# Patient Record
(demographics unavailable — no encounter records)

---

## 2025-03-18 NOTE — IMAGING
[Right] : right knee [There are no fractures, subluxations or dislocations. No significant abnormalities are seen] : There are no fractures, subluxations or dislocations. No significant abnormalities are seen [Mild patellofemoral OA] : Mild patellofemoral OA [de-identified] :   ----------------------------------------------------------------------------   Right knee exam:   Skin: no significant pertinent finding  Inspection:     (+moderate) Effusion     (neg) Malalignment     (neg) Swelling     (neg) Quad atrophy     (neg) J-sign  ROM:     0 - 125 degrees of flexion.  Tenderness:     (+) MJLT     (+) LJLT     (neg) Medial patellar facet tenderness     (+) Lateral patellar facet tenderness     (neg) Crepitus     (neg) Patellar grind tenderness     (neg) Patellar tendon     (neg) Quad tendon     Other:  Stability:     (neg) Lachman     (+) Varus/Valgus instability     (neg) Posterior drawer     (neg) Patellar translation: wnl  Additional tests:     (+lateral) McMurrays test     (neg) Patellar apprehension     Other:  Strength: 5/5 Q/H/TA/GS/EHL  Neuro: In tact to light touch throughout, DTR's wnl  Vascularity: Extremity warm and well perfused  Gait: antalgic gait     [FreeTextEntry9] : ? loose body x 2

## 2025-03-18 NOTE — DISCUSSION/SUMMARY
[de-identified] : Discussed options, Obtain MRI right knee rule out LMT Naproxen 500mg BID 4-5 days then prn Compression sleeve prn f/u p MRI   ----------------------------------------------------------------------------   Patient warned of specific risks of medication related to bleeding, GI issues, increase blood pressure, and cardiac risks in addition to additional risks.  Patient advised to discuss with PMD  if any presence of stated issues.  ----------------------------------------------------------------------------   All relevant imaging studies pertinent to today's visit, including x-rays, MRI's and/or other advanced imaging studies (CT/etc) were independently interpreted and reviewed with the patient as needed. Implications of the studies together with the patient's clinical picture were discussed to formulate a working diagnosis and management options were detailed.   The patient and/or guardian was advised of the diagnosis.  The natural history of the pathology was explained in full. All questions were answered.  The risks and benefits of conservative and interventional treatment alternatives were explained to the patient   The patient and/or guardian was advised if any advanced diagnostic/imaging study (MRI/CT/etc) is ordered to evaluate potential pathology in the affected area(s), they should follow up in the office to review the results of the study and determine further management that may be indicated.

## 2025-03-18 NOTE — HISTORY OF PRESENT ILLNESS
[Sudden] : sudden [9] : 9 [Sharp] : sharp [Frequent] : frequent [Stairs] : stairs [de-identified] : This is Ms. DYLAN NAJERA  a 47 year old female who comes in today complaining of right knee pain for about two weeks.  She's had three injuries in the past four years, on was a patella dislocation.  She then had two falls onto the same knee, one in 2022 on a hardwood floor then one in 2024. 2 weeks ago, started working out again after having surgery, having severe pain in the right knee. Pain going up/down stairs. No locking symptoms. Has not tried any nsaids.  hx of ovarian cyst  h/o meniscus tear [] : no [FreeTextEntry9] : arnica gel